# Patient Record
Sex: FEMALE | Race: WHITE
[De-identification: names, ages, dates, MRNs, and addresses within clinical notes are randomized per-mention and may not be internally consistent; named-entity substitution may affect disease eponyms.]

---

## 2020-08-31 ENCOUNTER — APPOINTMENT (OUTPATIENT)
Dept: NEUROSURGERY | Facility: CLINIC | Age: 55
End: 2020-08-31
Payer: MEDICAID

## 2020-08-31 VITALS
SYSTOLIC BLOOD PRESSURE: 127 MMHG | HEIGHT: 71 IN | HEART RATE: 68 BPM | DIASTOLIC BLOOD PRESSURE: 81 MMHG | WEIGHT: 155 LBS | BODY MASS INDEX: 21.7 KG/M2

## 2020-08-31 DIAGNOSIS — M71.38 OTHER BURSAL CYST, OTHER SITE: ICD-10-CM

## 2020-08-31 DIAGNOSIS — M43.10 SPONDYLOLISTHESIS, SITE UNSPECIFIED: ICD-10-CM

## 2020-08-31 PROBLEM — Z00.00 ENCOUNTER FOR PREVENTIVE HEALTH EXAMINATION: Status: ACTIVE | Noted: 2020-08-31

## 2020-08-31 PROCEDURE — 99204 OFFICE O/P NEW MOD 45 MIN: CPT

## 2020-08-31 RX ORDER — CYCLOBENZAPRINE HYDROCHLORIDE 10 MG/1
10 TABLET, FILM COATED ORAL
Qty: 10 | Refills: 0 | Status: ACTIVE | COMMUNITY
Start: 2020-07-06

## 2020-08-31 RX ORDER — MELOXICAM 15 MG/1
15 TABLET ORAL
Qty: 30 | Refills: 3 | Status: ACTIVE | COMMUNITY
Start: 2020-08-31 | End: 1900-01-01

## 2020-08-31 RX ORDER — TRAMADOL HYDROCHLORIDE 50 MG/1
50 TABLET, COATED ORAL
Qty: 10 | Refills: 0 | Status: ACTIVE | COMMUNITY
Start: 2020-07-06

## 2020-08-31 RX ORDER — IBUPROFEN AND FAMOTIDINE 800; 26.6 MG/1; MG/1
TABLET, COATED ORAL
Refills: 0 | Status: ACTIVE | COMMUNITY

## 2020-08-31 RX ORDER — GABAPENTIN 100 MG/1
100 CAPSULE ORAL
Qty: 60 | Refills: 0 | Status: ACTIVE | COMMUNITY
Start: 2020-07-15

## 2020-08-31 RX ORDER — PREDNISONE 20 MG/1
20 TABLET ORAL
Qty: 14 | Refills: 0 | Status: ACTIVE | COMMUNITY
Start: 2020-07-15

## 2020-08-31 RX ORDER — METHOCARBAMOL 750 MG/1
750 TABLET, FILM COATED ORAL
Qty: 30 | Refills: 0 | Status: ACTIVE | COMMUNITY
Start: 2020-08-31 | End: 1900-01-01

## 2020-08-31 RX ORDER — GABAPENTIN 300 MG/1
300 CAPSULE ORAL
Qty: 60 | Refills: 0 | Status: ACTIVE | COMMUNITY
Start: 2020-08-17

## 2020-08-31 RX ORDER — PREDNISONE 10 MG/1
10 TABLET ORAL
Qty: 20 | Refills: 0 | Status: ACTIVE | COMMUNITY
Start: 2020-07-01

## 2020-08-31 NOTE — PHYSICAL EXAM
[Motor Strength] : muscle strength was normal in all four extremities [Motor Tone] : muscle tone was normal in all four extremities [Involuntary Movements] : no involuntary movements were seen [No Muscle Atrophy] : normal bulk in all four extremities [Motor Handedness Right-Handed] : the patient is right hand dominant [FreeTextEntry6] : trace weakness EHL right

## 2020-08-31 NOTE — HISTORY OF PRESENT ILLNESS
[de-identified] : \vidya Denney is a pleasant 54-year-old accompanied by her . She has excruciating back and leg pain on the right since taking a bad fall in February. The pain seems to get worse really since April. She's done really no aggressive treatment at the spine level however it did see a neurologist who did an EMG which was unremarkable and ordered an MRI. She has symptoms and signs of reminiscent of axial back pain with radiculopathy right greater than left. She has no overt neurogenic claudication or cauda equina type symptoms.\par \par April \par PMD  Harris Resendez\par neuro Harris Cabrera\par

## 2020-08-31 NOTE — REASON FOR VISIT
[New Patient Visit] : a new patient visit [Referred By: _________] : Patient was referred by NITHIN [FreeTextEntry1] : Synovial cyst- [facility] MRI.

## 2020-08-31 NOTE — PLAN
[FreeTextEntry1] : DIAGNOSIS:    LUMBAR  STENOSIS/DISK DEGENERATION/SPONDYLOLISTHESIS  WITH RADICULOPATHY L45 right\par TREATMENT PLAN:  NON-SURGICAL  VS. LUMBAR DECOMPRESSION WITH INSTRUMENTED STABILIZATION AND FUSION   L4-5 \par \par This is a patient with degenerated lumbar disks with associated stenosis and spondylosis.  I have recommended nonsurgical management at this time.  This consists of physical therapy and/or manual medicine in conjunction to medical therapy and other conservative methods.  These include the consideration of trigger point injections and or the utilization of modalities such as TENS where applicable.  The next tier would be the referral to a pain management specialist (anesthesia or physiatry) for the consideration of spinal injections.  This includes the options of epidural steroids, facet injections as well as other novel techniques that may provide pain relief.  \par \par If all nonsurgical methods fail or there is neurological issue of concern, I have recommended lumbar decompression as a treatment option.  This entails removing the lamina and the medial facet joints along with the underlying hypertrophied ligamentum flavum.  This will allow for a widening of the spinal canal and the neuroforamen at the effected levels.  In doing so will likely result in added instability to the spine at this level requiring the need for instrumented stabilization and fusion.  This implies the use of pedicle screws and possibly interbody prosthetics to provide strength and anatomical alignment to the operated segments.  \par \par Risks and benefits of surgery were described to the patient in detail which include but not excluding those otherwise not mentioned:  coma paralysis, death, stroke, spinal fluid leak, nerve injury, weakness, infection, hardware malfunction/failure/mal-positioning requiring re-operation, vascular injury, nonunion/pseudoarthrosis, adjacent segment degeneration, dysphonia/dysphagia and persistent pain.\par \par I have reviewed the images in detail with the patient today in my office and have answered all questions regarding this condition to the best of my ability to the patient’s satisfaction.

## 2020-08-31 NOTE — RESULTS/DATA
[FreeTextEntry1] : [facility]  MRI  Reveals a grade 1 spondylolisthesis L4-5 with some facet arthropathy but small synovitis cyst at this level.

## 2020-09-16 RX ORDER — TRAMADOL HYDROCHLORIDE 50 MG/1
50 TABLET, COATED ORAL 3 TIMES DAILY
Qty: 21 | Refills: 0 | Status: ACTIVE | COMMUNITY
Start: 2020-09-16 | End: 1900-01-01

## 2024-01-23 ENCOUNTER — OFFICE (OUTPATIENT)
Dept: URBAN - METROPOLITAN AREA CLINIC 103 | Facility: CLINIC | Age: 59
Setting detail: OPHTHALMOLOGY
End: 2024-01-23
Payer: COMMERCIAL

## 2024-01-23 DIAGNOSIS — H52.7: ICD-10-CM

## 2024-01-23 DIAGNOSIS — H25.13: ICD-10-CM

## 2024-01-23 DIAGNOSIS — H10.433: ICD-10-CM

## 2024-01-23 PROCEDURE — 92014 COMPRE OPH EXAM EST PT 1/>: CPT | Performed by: OPHTHALMOLOGY

## 2024-01-23 PROCEDURE — 92015 DETERMINE REFRACTIVE STATE: CPT | Performed by: OPHTHALMOLOGY

## 2024-01-23 ASSESSMENT — REFRACTION_MANIFEST
OS_ADD: +2.00
OD_VA1: 20/20
OD_AXIS: 115
OS_SPHERE: +1.50
OS_AXIS: 148
OD_AXIS: 163
OS_CYLINDER: -0.25
OD_SPHERE: +1.50
OD_SPHERE: +2.25
OD_ADD: +2.50
OD_VA1: 20/30
OS_VA1: 20/25-
OD_CYLINDER: -0.25
OD_CYLINDER: +1.00
OU_VA: 20/20-
OS_CYLINDER: -0.50
OS_AXIS: 134
OS_VA1: 20/25-2
OS_SPHERE: +1.75
OS_ADD: +2.50
OD_ADD: +2.00

## 2024-01-23 ASSESSMENT — SPHEQUIV_DERIVED
OS_SPHEQUIV: 1.25
OD_SPHEQUIV: 1.375
OD_SPHEQUIV: 2.75
OS_SPHEQUIV: 1.5
OD_SPHEQUIV: 1.625
OS_SPHEQUIV: 1.375

## 2024-01-23 ASSESSMENT — REFRACTION_CURRENTRX
OS_OVR_VA: 20/
OD_SPHERE: +2.50
OS_VPRISM_DIRECTION: SV
OD_VPRISM_DIRECTION: SV
OS_SPHERE: +2.50
OD_OVR_VA: 20/

## 2024-01-23 ASSESSMENT — REFRACTION_AUTOREFRACTION
OS_CYLINDER: -0.50
OS_SPHERE: +1.50
OD_AXIS: 163
OS_AXIS: 115
OD_CYLINDER: -0.75
OD_SPHERE: +2.00

## 2024-01-23 ASSESSMENT — CONFRONTATIONAL VISUAL FIELD TEST (CVF)
OD_FINDINGS: FULL
OS_FINDINGS: FULL

## 2025-02-05 ENCOUNTER — OFFICE (OUTPATIENT)
Dept: URBAN - METROPOLITAN AREA CLINIC 103 | Facility: CLINIC | Age: 60
Setting detail: OPHTHALMOLOGY
End: 2025-02-05
Payer: COMMERCIAL

## 2025-02-05 DIAGNOSIS — H25.13: ICD-10-CM

## 2025-02-05 DIAGNOSIS — H52.7: ICD-10-CM

## 2025-02-05 DIAGNOSIS — H16.223: ICD-10-CM

## 2025-02-05 DIAGNOSIS — H10.433: ICD-10-CM

## 2025-02-05 PROCEDURE — 92014 COMPRE OPH EXAM EST PT 1/>: CPT | Performed by: OPHTHALMOLOGY

## 2025-02-05 PROCEDURE — 92015 DETERMINE REFRACTIVE STATE: CPT | Performed by: OPHTHALMOLOGY

## 2025-02-05 ASSESSMENT — REFRACTION_AUTOREFRACTION
OD_CYLINDER: -0.75
OS_SPHERE: +1.25
OS_AXIS: 130
OD_AXIS: 155
OD_SPHERE: +1.50
OS_CYLINDER: -0.25

## 2025-02-05 ASSESSMENT — VISUAL ACUITY
OD_BCVA: 20/50
OS_BCVA: 20/40-2

## 2025-02-05 ASSESSMENT — REFRACTION_MANIFEST
OS_AXIS: 148
OS_ADD: +2.50
OD_ADD: +2.50
OD_AXIS: 163
OS_ADD: +2.50
OD_SPHERE: +2.00
OD_CYLINDER: +1.00
OD_AXIS: 135
OD_CYLINDER: -0.50
OD_ADD: +2.50
OS_SPHERE: +1.25
OS_CYLINDER: SPHERE
OD_SPHERE: +2.25
OS_VA1: 20/25
OS_VA1: 20/25-2
OS_CYLINDER: -0.50
OS_SPHERE: +1.75
OD_VA1: 20/20-2
OD_VA1: 20/30

## 2025-02-05 ASSESSMENT — KERATOMETRY
OS_K2POWER_DIOPTERS: 45.25
OD_AXISANGLE_DEGREES: 070
OD_K2POWER_DIOPTERS: 45.25
OS_K1POWER_DIOPTERS: 44.50
OS_AXISANGLE_DEGREES: 096
OD_K1POWER_DIOPTERS: 44.00

## 2025-02-05 ASSESSMENT — TEAR BREAK UP TIME (TBUT)
OS_TBUT: T
OD_TBUT: T

## 2025-02-05 ASSESSMENT — REFRACTION_CURRENTRX
OD_VPRISM_DIRECTION: SV
OS_VPRISM_DIRECTION: SV
OD_SPHERE: +2.50
OD_OVR_VA: 20/
OS_SPHERE: +2.50
OS_OVR_VA: 20/

## 2025-02-05 ASSESSMENT — SUPERFICIAL PUNCTATE KERATITIS (SPK)
OD_SPK: ABSENT
OS_SPK: ABSENT

## 2025-02-05 ASSESSMENT — TONOMETRY
OD_IOP_MMHG: 17
OS_IOP_MMHG: 19